# Patient Record
Sex: MALE | NOT HISPANIC OR LATINO | URBAN - METROPOLITAN AREA
[De-identification: names, ages, dates, MRNs, and addresses within clinical notes are randomized per-mention and may not be internally consistent; named-entity substitution may affect disease eponyms.]

---

## 2023-05-25 ENCOUNTER — TRANSFERRED RECORDS (OUTPATIENT)
Dept: HEALTH INFORMATION MANAGEMENT | Facility: CLINIC | Age: 33
End: 2023-05-25

## 2023-06-20 ENCOUNTER — TRANSCRIBE ORDERS (OUTPATIENT)
Dept: OTHER | Age: 33
End: 2023-06-20

## 2023-06-20 DIAGNOSIS — H90.3 SENSORINEURAL HEARING LOSS, BILATERAL: Primary | ICD-10-CM

## 2023-06-22 NOTE — TELEPHONE ENCOUNTER
FUTURE VISIT INFORMATION:      FUTURE VISIT INFORMATION:  Date: 9/5/23  Time: 1 PM  Location: [facility]  REFERRAL INFORMATION:  Referring provider: Yuridia Sosa MD  Referring providers clinic: [facility]   Reason for visit/diagnosis:  Sensorineural hearing loss, bilateral [H90.3] ref by Yuridia Sosa MD, recs in chart - sched per pt    RECORDS REQUESTED FROM:       Clinic name Comments Records Status Imaging Status   [facility]  2/27/23 OV with Yuridia Sosa MD [facility] [place] [phone] 5/25/23 audiogram with Mendy Rangel Scanned in Epic       July 24, 2023 at 12:10 PM - Called and LM with CB number x1 to discuss outside records -[place]  July 26, 2023 at 8:37 AM - Received VM from the patient. Called the patient back but went to his VM again. Left my CB number -[place]  July 26, 2023 at 12:25 PM - Patient returned call have not been seen by another ENT, no outside records -[place]

## 2023-09-01 DIAGNOSIS — Z01.10 ENCOUNTER FOR HEARING EXAMINATION: Primary | ICD-10-CM

## 2023-09-05 ENCOUNTER — PRE VISIT (OUTPATIENT)
Dept: OTOLARYNGOLOGY | Facility: CLINIC | Age: 33
End: 2023-09-05

## 2023-09-05 ENCOUNTER — OFFICE VISIT (OUTPATIENT)
Dept: AUDIOLOGY | Facility: CLINIC | Age: 33
End: 2023-09-05
Payer: COMMERCIAL

## 2023-09-05 ENCOUNTER — OFFICE VISIT (OUTPATIENT)
Dept: OTOLARYNGOLOGY | Facility: CLINIC | Age: 33
End: 2023-09-05
Payer: COMMERCIAL

## 2023-09-05 VITALS
SYSTOLIC BLOOD PRESSURE: 139 MMHG | HEIGHT: 70 IN | TEMPERATURE: 97.9 F | HEART RATE: 48 BPM | OXYGEN SATURATION: 100 % | WEIGHT: 152 LBS | DIASTOLIC BLOOD PRESSURE: 84 MMHG | BODY MASS INDEX: 21.76 KG/M2

## 2023-09-05 DIAGNOSIS — H61.23 EXCESSIVE CERUMEN IN BOTH EAR CANALS: ICD-10-CM

## 2023-09-05 DIAGNOSIS — H93.13 TINNITUS, BILATERAL: ICD-10-CM

## 2023-09-05 DIAGNOSIS — H90.3 SENSORINEURAL HEARING LOSS, BILATERAL: ICD-10-CM

## 2023-09-05 DIAGNOSIS — Z01.10 ENCOUNTER FOR HEARING EXAMINATION: ICD-10-CM

## 2023-09-05 DIAGNOSIS — R42 DIZZINESS: Primary | ICD-10-CM

## 2023-09-05 DIAGNOSIS — H90.3 ASYMMETRIC SNHL (SENSORINEURAL HEARING LOSS): Primary | ICD-10-CM

## 2023-09-05 PROCEDURE — 92557 COMPREHENSIVE HEARING TEST: CPT | Performed by: AUDIOLOGIST

## 2023-09-05 PROCEDURE — 92565 STENGER TEST PURE TONE: CPT | Performed by: AUDIOLOGIST

## 2023-09-05 PROCEDURE — 92550 TYMPANOMETRY & REFLEX THRESH: CPT | Performed by: AUDIOLOGIST

## 2023-09-05 PROCEDURE — 92504 EAR MICROSCOPY EXAMINATION: CPT | Performed by: OTOLARYNGOLOGY

## 2023-09-05 PROCEDURE — 99244 OFF/OP CNSLTJ NEW/EST MOD 40: CPT | Mod: 25 | Performed by: OTOLARYNGOLOGY

## 2023-09-05 ASSESSMENT — PAIN SCALES - GENERAL: PAINLEVEL: NO PAIN (0)

## 2023-09-05 NOTE — PROGRESS NOTES
AUDIOLOGY REPORT    SUMMARY: Audiology visit completed. See audiogram for results.      RECOMMENDATIONS: Follow-up with ENT.    Garth Vasques, CCC-A  Clinical Audiologist  [place] #[id number]

## 2023-09-05 NOTE — PROGRESS NOTES
Dear Dr. Monge Ref-Primary, Physician:    I had the pleasure of meeting Stanley Oneal in consultation today at the [facility] at your request.    CHIEF COMPLAINT: Dizziness    HISTORY OF PRESENT ILLNESS: Patient is a 33-year-old in today for consultation on his ears and dizziness patient is a 33-year-old in today for consultation on his ears and dizziness along with tinnitus referred from his family physician.  He says he has had tinnitus in both ears for about a year.  Seems to be constant.  Does not problematic for him just annoying.  He was found to have hearing loss and notices some hearing issues mostly at noise.  He has not been around significant noise although he does ride horses at [place] and is around horses a lot.  Did not grow up on a farm.  He did have dizziness with lying down in the past and was diagnosed with benign positional vertigo.  He did go through an Epley maneuver that which cleared the vertigo.  Since then always been having dizziness he describes more as a lightheadedness or unsteadiness if he turns quickly.  Sometimes he will just have some nausea or uneasy sensation.  This will usually last for minutes.  He is able to function and it passes quickly.  He notices it occasionally when he is riding horses and will turn his head quickly.  It again has not prevented him from functioning at all or working but just is annoying and has not cleared.  He denies any dysphagia, hoarseness, facial paresthesias.  He has not had any imaging studies or vestibular testing performed.  He is in good health otherwise.    ALLERGIES:  No Known Allergies    HABITS: Social History    Substance and Sexual Activity      Alcohol use: Not on file     History   Smoking Status    Former    Types: Dip, chew, snus or snuff   Smokeless Tobacco    Former    Quit date: 8/3/2019         PAST MEDICAL HISTORY: Please see today's intake form (for the remainder of the [facility]) which I reviewed and  signed.  Past Medical History:   Diagnosis Date    Benign positional vertigo February 2022    Head injury 2016    Hearing problem     Tinnitus        FAMILY HISTORY/SOCIAL HISTORY:   Family History   Problem Relation Age of Onset    Hypertension Father       Social History     Socioeconomic History    Marital status: Patient Declined     Spouse name: Not on file    Number of children: Not on file    Years of education: Not on file    Highest education level: Not on file   Occupational History    Not on file   Tobacco Use    Smoking status: Former     Types: Dip, chew, snus or snuff    Smokeless tobacco: Former     Quit date: 8/3/2019   Substance and Sexual Activity    Alcohol use: Not on file    Drug use: Never    Sexual activity: Yes     Partners: Female   Other Topics Concern    Not on file   Social History Narrative    Not on file     Social Determinants of Health     Financial Resource Strain: Not on file   Food Insecurity: Not on file   Transportation Needs: Not on file   Physical Activity: Not on file   Stress: Not on file   Social Connections: Not on file   Intimate Partner Violence: Not on file   Housing Stability: Not on file       REVIEW OF SYSTEMS: Patient Supplied Answers to Review of Systems      8/30/2023    12:06 PM    ENT ROS   Neurology Dizzy spells    Numbness   Ears, Nose, Throat Hearing loss    Ringing/noise in ears   Musculoskeletal Back pain    Neck pain   Endocrine Frequent urination            The remainder of the 10 point ROS is negative    PHYSICIAL EXAMINATION:  Constitutional: The patient was well-groomed and in no acute distress.   Skin: Warm and pink.  Psychiatric: The patient's affect was calm, cooperative, and appropriate.   Respiratory: Breathing comfortably without stridor or exertion of accessory muscles.  Eyes: Pupils were equal and reactive. Extraocular movement intact.   Head: Normocephalic and atraumatic. No lesions or scars.  Ears: Patient placed under the microscope for  microscopic evaluation and cleaning of cerumen which was obscuring full visualization and complete assessment of both TMs. Under high power magnification, the right ear was examined and cleaned of cerumen using instruments.  After cleaning, TM is fully visualized and has normal position with normal middle ear aeration. The left ear was then cleaned and inspected using microscope, instruments and similar techniques. After cleaning of cerumen, the TM has normal position with normal aeration to middle ear.  Nose: Sinuses were nontender. Anterior rhinoscopy revealed midline septum and absence of purulence or polyps.  Oral Cavity: Normal tongue, floor of mouth, buccal mucosa, and palate. No lesions or masses on inspection or palpation. No abnormal lymph tissue in the oropharynx.   Neck: The parotid is soft without masses. Supple with normal laryngeal and tracheal landmarks.   Lymphatic: There is no palpable lymphadenopathy or other masses in the neck.   Neurologic: Alert and oriented x 3. Cranial nerves III-XI within normal limits. Voice quality normal.  Cerebellar Function Tests:  Grossly normal    Audiogram: Audiogram performed shows a bilateral high-frequency sensorineural hearing loss above 2000 Hz, slightly worse on the left.  He has 100% discrimination in each ear.  Also has normal bilateral type A tympanograms, normal tuning forks.      IMPRESSION AND PLAN:   Dizziness: Discussed this with him and his wife for some time today.  I do not think this sounds to be inner ear in origin just by the description of symptoms and that he has no vertigo sensation and the uneasy sensation only lasts for seconds to a minute.  Nevertheless I will go ahead and do some studies, set him up for an MRI scan.  Also set him up for vestibular studies with a VNG.  They would like to do that on the same day and see him back in follow-up.  We will set that up at their convenience and proceed accordingly.  Bilateral sensorineural hearing  loss: Mild, discussed protect ears from noise.  No treatment needed at this time, do not feel it is severe enough to warrant hearing aids at this time.  Monitor.  Bilateral tinnitus: Secondary sensorineural loss, no treatment needed, monitor.  Excessive cerumen: Cleaned today with instruments and microscope as above.  No further treatment needed, monitor.    Again we will set him up for vestibular studies and imaging studies and follow-up same day with results.    Thank you very much for the opportunity to participate in the care of your patient.    Rick L Nissen MD      Dizziness

## 2023-09-05 NOTE — NURSING NOTE
"Chief Complaint   Patient presents with    Consult     Bilateral sensorineural hearing loss      Blood pressure 139/84, pulse (!) 48, temperature 97.9  F (36.6  C), height 1.778 m (5' 10\"), weight 68.9 kg (152 lb), SpO2 100 %.  Pavel Palma LPN    "

## 2023-09-05 NOTE — PATIENT INSTRUCTIONS
1. You were seen in the ENT Clinic today by Dr. Nissen.  If you have any questions or concerns after your appointment, please call   - Option 1: ENT Clinic: [phone]   - Option 2: Mariela (Dr. Nissen's Nurse): [phone]                   Kathleen (Dr. Nissen's Nurse): [phone]      2.   Plan to return to clinic after MRI temporal bone/IAC and VNG in audiology      How to Contact Us:  Send a [employer] message to your provider. Our team will respond to you via [employer]. Occasionally, we will need to call you to get further information.  For urgent matters (Monday-Friday), call the ENT Clinic: [phone] and speak with a call center team member - they will route your call appropriately.   If you'd like to speak directly with a nurse, please find our contact information below. We do our best to check voicemail frequently throughout the day, and will work to call you back within 1-2 days. For urgent matters, please use the general clinic phone numbers listed above.       Mariela MENDOZA LPN  [facility] - Otolaryngology

## 2023-09-05 NOTE — LETTER
9/5/2023       RE: Stanley Oneal  [address] [place] [address]     Dear Colleague,    Thank you for referring your patient, Stanley Oneal, to the [facility] EAR NOSE [facility]ROAT [facility] [place] at [facility]. Please see a copy of my visit note below.    Dear Dr. Monge Ref-Primary, Physician:    I had the pleasure of meeting Stanley Oneal in consultation today at the [facility] at your request.    CHIEF COMPLAINT: Dizziness    HISTORY OF PRESENT ILLNESS: Patient is a 33-year-old in today for consultation on his ears and dizziness patient is a 33-year-old in today for consultation on his ears and dizziness along with tinnitus referred from his family physician.  He says he has had tinnitus in both ears for about a year.  Seems to be constant.  Does not problematic for him just annoying.  He was found to have hearing loss and notices some hearing issues mostly at noise.  He has not been around significant noise although he does ride horses at [place] and is around horses a lot.  Did not grow up on a farm.  He did have dizziness with lying down in the past and was diagnosed with benign positional vertigo.  He did go through an Epley maneuver that which cleared the vertigo.  Since then always been having dizziness he describes more as a lightheadedness or unsteadiness if he turns quickly.  Sometimes he will just have some nausea or uneasy sensation.  This will usually last for minutes.  He is able to function and it passes quickly.  He notices it occasionally when he is riding horses and will turn his head quickly.  It again has not prevented him from functioning at all or working but just is annoying and has not cleared.  He denies any dysphagia, hoarseness, facial paresthesias.  He has not had any imaging studies or vestibular testing performed.  He is in good health otherwise.    ALLERGIES:  No Known Allergies    HABITS: Social  History    Substance and Sexual Activity      Alcohol use: Not on file     History   Smoking Status    Former    Types: Dip, chew, snus or snuff   Smokeless Tobacco    Former    Quit date: 8/3/2019         PAST MEDICAL HISTORY: Please see today's intake form (for the remainder of the PMH) which I reviewed and signed.  Past Medical History:   Diagnosis Date    Benign positional vertigo February 2022    Head injury 2016    Hearing problem     Tinnitus        FAMILY HISTORY/SOCIAL HISTORY:   Family History   Problem Relation Age of Onset    Hypertension Father       Social History     Socioeconomic History    Marital status: Patient Declined     Spouse name: Not on file    Number of children: Not on file    Years of education: Not on file    Highest education level: Not on file   Occupational History    Not on file   Tobacco Use    Smoking status: Former     Types: Dip, chew, snus or snuff    Smokeless tobacco: Former     Quit date: 8/3/2019   Substance and Sexual Activity    Alcohol use: Not on file    Drug use: Never    Sexual activity: Yes     Partners: Female   Other Topics Concern    Not on file   Social History Narrative    Not on file     Social Determinants of Health     Financial Resource Strain: Not on file   Food Insecurity: Not on file   Transportation Needs: Not on file   Physical Activity: Not on file   Stress: Not on file   Social Connections: Not on file   Intimate Partner Violence: Not on file   Housing Stability: Not on file       REVIEW OF SYSTEMS: Patient Supplied Answers to Review of Systems      8/30/2023    12:06 PM    ENT ROS   Neurology Dizzy spells    Numbness   Ears, Nose, Throat Hearing loss    Ringing/noise in ears   Musculoskeletal Back pain    Neck pain   Endocrine Frequent urination            The remainder of the 10 point ROS is negative    PHYSICIAL EXAMINATION:  Constitutional: The patient was well-groomed and in no acute distress.   Skin: Warm and pink.  Psychiatric: The patient's  affect was calm, cooperative, and appropriate.   Respiratory: Breathing comfortably without stridor or exertion of accessory muscles.  Eyes: Pupils were equal and reactive. Extraocular movement intact.   Head: Normocephalic and atraumatic. No lesions or scars.  Ears: Patient placed under the microscope for microscopic evaluation and cleaning of cerumen which was obscuring full visualization and complete assessment of both TMs. Under high power magnification, the right ear was examined and cleaned of cerumen using instruments.  After cleaning, TM is fully visualized and has normal position with normal middle ear aeration. The left ear was then cleaned and inspected using microscope, instruments and similar techniques. After cleaning of cerumen, the TM has normal position with normal aeration to middle ear.  Nose: Sinuses were nontender. Anterior rhinoscopy revealed midline septum and absence of purulence or polyps.  Oral Cavity: Normal tongue, floor of mouth, buccal mucosa, and palate. No lesions or masses on inspection or palpation. No abnormal lymph tissue in the oropharynx.   Neck: The parotid is soft without masses. Supple with normal laryngeal and tracheal landmarks.   Lymphatic: There is no palpable lymphadenopathy or other masses in the neck.   Neurologic: Alert and oriented x 3. Cranial nerves III-XI within normal limits. Voice quality normal.  Cerebellar Function Tests:  Grossly normal    Audiogram: Audiogram performed shows a bilateral high-frequency sensorineural hearing loss above 2000 Hz, slightly worse on the left.  He has 100% discrimination in each ear.  Also has normal bilateral type A tympanograms, normal tuning forks.      IMPRESSION AND PLAN:   Dizziness: Discussed this with him and his wife for some time today.  I do not think this sounds to be inner ear in origin just by the description of symptoms and that he has no vertigo sensation and the uneasy sensation only lasts for seconds to a minute.   Nevertheless I will go ahead and do some studies, set him up for an MRI scan.  Also set him up for vestibular studies with a VNG.  They would like to do that on the same day and see him back in follow-up.  We will set that up at their convenience and proceed accordingly.  Bilateral sensorineural hearing loss: Mild, discussed protect ears from noise.  No treatment needed at this time, do not feel it is severe enough to warrant hearing aids at this time.  Monitor.  Bilateral tinnitus: Secondary sensorineural loss, no treatment needed, monitor.  Excessive cerumen: Cleaned today with instruments and microscope as above.  No further treatment needed, monitor.    Again we will set him up for vestibular studies and imaging studies and follow-up same day with results.    Thank you very much for the opportunity to participate in the care of your patient.    Rick L Nissen MD      Dizziness    Again, thank you for allowing me to participate in the care of your patient.      Sincerely,    Rick L. Nissen, MD

## 2023-10-19 ENCOUNTER — TELEPHONE (OUTPATIENT)
Dept: AUDIOLOGY | Facility: CLINIC | Age: 33
End: 2023-10-19
Payer: COMMERCIAL

## 2023-10-19 NOTE — TELEPHONE ENCOUNTER
"\"I m calling from the Audiology and Balance Testing department at the . This is just a call to remind you of your upcoming Balance Testing appointment on [Date], and to see if you have any questions or concerns regarding the balance testing you'll be doing. You should have received an itinerary via mail or via [employer], if you are active, that goes over what to expect and explains the dos and don ts both 48 hours before, and the day of. There is a list of medications for you to review on the itinerary that we would like you to stop taking beforehand. If you didn t receive the itinerary or you still have questions, please give our clinic a call at [phone]. Otherwise, we will see you on [Date] starting at [Time].\"    Please send encounter if patient would like to reschedule.  "